# Patient Record
Sex: MALE | Employment: UNEMPLOYED | URBAN - METROPOLITAN AREA
[De-identification: names, ages, dates, MRNs, and addresses within clinical notes are randomized per-mention and may not be internally consistent; named-entity substitution may affect disease eponyms.]

---

## 2019-04-04 ENCOUNTER — OFFICE VISIT (OUTPATIENT)
Dept: UROLOGY | Age: 39
End: 2019-04-04
Payer: COMMERCIAL

## 2019-04-04 ENCOUNTER — HOSPITAL ENCOUNTER (OUTPATIENT)
Age: 39
Setting detail: SPECIMEN
Discharge: HOME | End: 2019-04-04
Payer: COMMERCIAL

## 2019-04-04 VITALS
SYSTOLIC BLOOD PRESSURE: 118 MMHG | TEMPERATURE: 96.9 F | HEIGHT: 66 IN | WEIGHT: 153 LBS | BODY MASS INDEX: 24.59 KG/M2 | DIASTOLIC BLOOD PRESSURE: 78 MMHG

## 2019-04-04 DIAGNOSIS — N20.1 URETERAL CALCULUS: ICD-10-CM

## 2019-04-04 DIAGNOSIS — N20.0 RENAL CALCULUS: ICD-10-CM

## 2019-04-04 DIAGNOSIS — N20.0 RENAL CALCULUS: Primary | ICD-10-CM

## 2019-04-04 LAB
-: ABNORMAL
AMORPHOUS: ABNORMAL
BACTERIA: ABNORMAL
BILIRUBIN URINE: NEGATIVE
CASTS UA: ABNORMAL /LPF
COLOR: ABNORMAL
COMMENT UA: ABNORMAL
CRYSTALS, UA: ABNORMAL /HPF
EPITHELIAL CELLS UA: ABNORMAL /HPF
GLUCOSE URINE: NEGATIVE
KETONES, URINE: NEGATIVE
LEUKOCYTE ESTERASE, URINE: NEGATIVE
MUCUS: ABNORMAL
NITRITE, URINE: NEGATIVE
OTHER OBSERVATIONS UA: ABNORMAL
PH UA: 5 (ref 5–8)
PROTEIN UA: ABNORMAL
RBC UA: ABNORMAL /HPF (ref 0–2)
RENAL EPITHELIAL, UA: ABNORMAL /HPF
SPECIFIC GRAVITY UA: 1.02 (ref 1–1.03)
TRICHOMONAS: ABNORMAL
TURBIDITY: CLEAR
URINE HGB: NEGATIVE
UROBILINOGEN, URINE: NORMAL
WBC UA: ABNORMAL /HPF
YEAST: ABNORMAL

## 2019-04-04 PROCEDURE — 99205 OFFICE O/P NEW HI 60 MIN: CPT | Performed by: UROLOGY

## 2019-04-04 PROCEDURE — 87086 URINE CULTURE/COLONY COUNT: CPT

## 2019-04-04 PROCEDURE — 81001 URINALYSIS AUTO W/SCOPE: CPT

## 2019-04-04 RX ORDER — HYDROCODONE BITARTRATE AND ACETAMINOPHEN 5; 325 MG/1; MG/1
TABLET ORAL
Refills: 0 | COMMUNITY
Start: 2019-04-02 | End: 2020-10-15 | Stop reason: ALTCHOICE

## 2019-04-04 RX ORDER — LEVOFLOXACIN 500 MG/1
TABLET, FILM COATED ORAL
Refills: 0 | COMMUNITY
Start: 2019-03-27 | End: 2020-10-15 | Stop reason: ALTCHOICE

## 2019-04-04 RX ORDER — OXYBUTYNIN CHLORIDE 5 MG/1
TABLET ORAL
Refills: 1 | COMMUNITY
Start: 2019-03-15 | End: 2020-10-15

## 2019-04-04 RX ORDER — TAMSULOSIN HYDROCHLORIDE 0.4 MG/1
0.4 CAPSULE ORAL DAILY
Qty: 30 CAPSULE | Refills: 0 | Status: SHIPPED | OUTPATIENT
Start: 2019-04-04 | End: 2020-10-15 | Stop reason: ALTCHOICE

## 2019-04-04 RX ORDER — ONDANSETRON 4 MG/1
TABLET, ORALLY DISINTEGRATING ORAL
Refills: 0 | COMMUNITY
Start: 2019-04-02 | End: 2020-10-15

## 2019-04-04 ASSESSMENT — ENCOUNTER SYMPTOMS
BACK PAIN: 0
EYE REDNESS: 0
VOMITING: 0
ABDOMINAL PAIN: 0
NAUSEA: 0
COUGH: 0
WHEEZING: 0
COLOR CHANGE: 0
EYE PAIN: 0
SHORTNESS OF BREATH: 0

## 2019-04-04 NOTE — PATIENT INSTRUCTIONS
SURVEY:    You may be receiving a survey from [employer] regarding your visit today. Please complete the survey to enable us to provide the highest quality of care to you and your family. If you cannot score us a very good on any question, please call the office to discuss how we could have made your experience a very good one. Thank you.

## 2019-04-04 NOTE — PROGRESS NOTES
HPI:    Patient is a 41 y.o. male in no acute distress. He is alert and oriented to person, place, and time. Patient being seen here today as a new patient. Patient was referred by the emergency department outside facility. Patient did have recent CT scan. This film did show several distal left ureteral coccus. There is also small stones in left kidney. There is also a nonobstructing stone in the right kidney. This film was independently reviewed today. Patient has had 2 lithotripsies in the past 3 months. Patient is currently having no pain. Patient did go to the emergency department 2 days ago for abdominal pain. This is mainly in the right side. Currently he has no fevers nausea or vomiting. Past Medical History:   Diagnosis Date    Adult behavior problem     Stone, kidney     Wears glasses      Past Surgical History:   Procedure Laterality Date    APPENDECTOMY      CYSTOSCOPY      CYSTOURETHROSCOPY/STENT REMOVAL      HERNIA REPAIR      KIDNEY STONE SURGERY      MOUTH SURGERY      URETER STENT PLACEMENT       Outpatient Encounter Medications as of 4/4/2019   Medication Sig Dispense Refill    tamsulosin (FLOMAX) 0.4 MG capsule Take 1 capsule by mouth daily Take to facilitate stone passage 30 capsule 0    HYDROcodone-acetaminophen (NORCO) 5-325 MG per tablet Take 1 tablet every 6 hours as needed for pain  0    levofloxacin (LEVAQUIN) 500 MG tablet TAKE 1 TABLET EVERY MORNING  0    ondansetron (ZOFRAN-ODT) 4 MG disintegrating tablet Dissolve 1 tablet in mouth every 6 hours as needed for nausea/vomiting  0    oxybutynin (DITROPAN) 5 MG tablet TAKE 1 TABLET TWICE DAILY FOR BLADDER spasms  1     No facility-administered encounter medications on file as of 4/4/2019.        Current Outpatient Medications on File Prior to Visit   Medication Sig Dispense Refill    HYDROcodone-acetaminophen (NORCO) 5-325 MG per tablet Take 1 tablet every 6 hours as needed for pain  0    levofloxacin (LEVAQUIN) 500 MG tablet TAKE 1 TABLET EVERY MORNING  0    ondansetron (ZOFRAN-ODT) 4 MG disintegrating tablet Dissolve 1 tablet in mouth every 6 hours as needed for nausea/vomiting  0    oxybutynin (DITROPAN) 5 MG tablet TAKE 1 TABLET TWICE DAILY FOR BLADDER spasms  1     No current facility-administered medications on file prior to visit. Amoxicillin and Other  Family History   Problem Relation Age of Onset    Crohn's Disease Father      Social History     Tobacco Use   Smoking Status Current Some Day Smoker    Types: Cigarettes   Smokeless Tobacco Current User    Types: Snuff, Chew       Social History     Substance and Sexual Activity   Alcohol Use Not Currently       Review of Systems   Constitutional: Negative for appetite change, chills and fever. Eyes: Negative for pain, redness and visual disturbance. Respiratory: Negative for cough, shortness of breath and wheezing. Cardiovascular: Negative for chest pain and leg swelling. Gastrointestinal: Negative for abdominal pain, nausea and vomiting. Genitourinary: Negative for difficulty urinating, discharge, dysuria, flank pain, frequency, hematuria, scrotal swelling and testicular pain. Musculoskeletal: Negative for back pain, joint swelling and myalgias. Skin: Negative for color change, rash and wound. Neurological: Negative for dizziness, tremors and numbness. Hematological: Negative for adenopathy. Does not bruise/bleed easily. /78   Temp 96.9 °F (36.1 °C) (Tympanic)   Ht 5' 6\" (1.676 m)   Wt 153 lb (69.4 kg)   BMI 24.69 kg/m²       PHYSICAL EXAM:  Constitutional: Patient in no acute distress; Neuro: alert and oriented to person place and time. Psych: Mood and affect normal.  Skin: Normal  Lungs: Respiratory effort normal  Cardiovascular:  Normal peripheral pulses  Abdomen: Soft, non-tender, non-distended with no CVA, flank pain, hepatosplenomegaly or hernia.   Kidneys normal.  Bladder non-tender and not distended. Lymphatics: no palpable lymphadenopathy  Penis normal  Urethral meatus normal  Scrotal exam normal  Testicles normal bilaterally  Epididymis normal bilaterally  No evidence of inguinal hernia        Lab Results   Component Value Date    BUN 13 02/13/2013     Lab Results   Component Value Date    CREATININE 0.91 02/13/2013     No results found for: PSA    ASSESSMENT:  This is a 41 y.o. male with the following diagnoses:   Diagnosis Orders   1. Renal calculus  Urinalysis with Microscopic    Urine culture clean catch   2. Ureteral calculus  Urinalysis with Microscopic    Urine culture clean catch    tamsulosin (FLOMAX) 0.4 MG capsule         PLAN:  We'll plan for left-sided holmium laser lithotripsy into weeks. I did give him Flomax today. I did recommend increasing fluids. He does best stones we will cancel his surgery. Otherwise we will go up with scope and laser in 2 weeks. I did explain to him the risks and benefits of the surgery. This included major risks such as ureteral perforation.

## 2019-04-05 LAB
CULTURE: NO GROWTH
Lab: NORMAL
SPECIMEN DESCRIPTION: NORMAL

## 2019-04-10 ENCOUNTER — ANESTHESIA EVENT (OUTPATIENT)
Dept: OPERATING ROOM | Age: 39
End: 2019-04-10
Payer: COMMERCIAL

## 2019-04-10 ENCOUNTER — TELEPHONE (OUTPATIENT)
Dept: UROLOGY | Age: 39
End: 2019-04-10

## 2019-04-10 NOTE — TELEPHONE ENCOUNTER
----- Message from Benancio Simmonds, APRN - CNP sent at 4/10/2019  7:29 AM EDT -----  Call pt - urine cx reviewed and negative for UTI & for significant microhematuria

## 2019-04-17 PROBLEM — N20.1 URETERAL CALCULUS: Status: ACTIVE | Noted: 2019-04-17

## 2019-04-17 NOTE — H&P
History and Physical    Patient:  Edwar Abad  MRN: [id number]    CHIEF COMPLAINT:  Left flan kpain    HISTORY OF PRESENT ILLNESS:   The patient is a 41 y.o. male who presents with Leftt flank pain. Patient being seen here today as a new patient. Patient was referred by the emergency department outside facility. Patient did have recent CT scan. This film did show several distal left ureteral coccus. There is also small stones in left kidney. There is also a nonobstructing stone in the right kidney. This film was independently reviewed today. Patient has had 2 lithotripsies in the past 3 months. Patient is currently having no pain. Patient did go to the emergency department 2 days ago for abdominal pain. This is mainly in the right side. Currently he has no fevers nausea or vomiting. Past Medical History:    Past Medical History:   Diagnosis Date    Adult behavior problem     Stone, kidney     Wears glasses        Past Surgical History:    Past Surgical History:   Procedure Laterality Date    APPENDECTOMY      CYSTOSCOPY      CYSTOURETHROSCOPY/STENT REMOVAL      HERNIA REPAIR      KIDNEY STONE SURGERY      MOUTH SURGERY      URETER STENT PLACEMENT         Medications Prior to Admission:    Prior to Admission medications    Medication Sig Start Date End Date Taking?  Authorizing Provider   HYDROcodone-acetaminophen (NORCO) 5-325 MG per tablet Take 1 tablet every 6 hours as needed for pain 4/2/19   Historical Provider, MD   levofloxacin (LEVAQUIN) 500 MG tablet TAKE 1 TABLET EVERY MORNING 3/27/19   Historical Provider, MD   ondansetron (ZOFRAN-ODT) 4 MG disintegrating tablet Dissolve 1 tablet in mouth every 6 hours as needed for nausea/vomiting 4/2/19   Historical Provider, MD   oxybutynin (DITROPAN) 5 MG tablet TAKE 1 TABLET TWICE DAILY FOR BLADDER spasms 3/15/19   Historical Provider, MD   tamsulosin (FLOMAX) 0.4 MG capsule Take 1 capsule by mouth daily Take to facilitate stone passage 4/4/19 Radha Hernández MD       Allergies:  Amoxicillin and Other    Social History:    Social History     Socioeconomic History    Marital status: Single     Spouse name: Not on file    Number of children: Not on file    Years of education: Not on file    Highest education level: Not on file   Occupational History    Not on file   Social Needs    Financial resource strain: Not on file    Food insecurity:     Worry: Not on file     Inability: Not on file    Transportation needs:     Medical: Not on file     Non-medical: Not on file   Tobacco Use    Smoking status: Current Some Day Smoker     Types: Cigarettes    Smokeless tobacco: Current User     Types: Snuff, Chew   Substance and Sexual Activity    Alcohol use: Not Currently    Drug use: Never    Sexual activity: Not on file   Lifestyle    Physical activity:     Days per week: Not on file     Minutes per session: Not on file    Stress: Not on file   Relationships    Social connections:     Talks on phone: Not on file     Gets together: Not on file     Attends [employer] service: Not on file     Active member of club or organization: Not on file     Attends meetings of clubs or organizations: Not on file     Relationship status: Not on file    Intimate partner violence:     Fear of current or ex partner: Not on file     Emotionally abused: Not on file     Physically abused: Not on file     Forced sexual activity: Not on file   Other Topics Concern    Not on file   Social History Narrative    Not on file       Family History:    Family History   Problem Relation Age of Onset    Crohn's Disease Father        REVIEW OF SYSTEMS:  All systems reviewed and negative except for that already noted in the HPI. Physical Exam:      No data found. Constitutional: Patient in no acute distress; Neuro: alert and oriented to person place and time.     Psych: Mood and affect normal.  Skin: Normal  Lungs: Respiratory effort normal  Cardiovascular:  Normal peripheral pulses  Abdomen: Soft, non-tender, non-distended with no CVA, flank pain, hepatosplenomegaly or hernia. Kidneys normal.  Bladder non-tender and not distended. Lymphatics: no palpable lymphadenopathy  Penis normal and circumcised  Urethral meatus normal  Scrotal exam normal  Testicles normal bilaterally  Epididymis normal bilaterally  No evidence of inguinal hernia  Anus and perineum normal  Normal rectal tone with no masses  Prostate soft, non-tender to palpation. No palpable nodules. Estimated 40 grams. Seminal vesicles not palpable. LABS:   No results for input(s): WBC, HGB, HCT, MCV, PLT in the last 72 hours. No results for input(s): NA, K, CL, CO2, PHOS, BUN, CREATININE in the last 72 hours. Invalid input(s): CA  No results found for: PSA    Additional Lab/culture results:    Urinalysis: No results for input(s): COLORU, PHUR, LABCAST, WBCUA, RBCUA, MUCUS, TRICHOMONAS, YEAST, BACTERIA, CLARITYU, SPECGRAV, LEUKOCYTESUR, UROBILINOGEN, Adah March in the last 72 hours.     Invalid input(s): NITRATE, GLUCOSEUKETONESUAMORPHOUS     -----------------------------------------------------------------  Imaging Results:    Assessment and Plan   Impression:    Patient Active Problem List   Diagnosis    Behavioral problems    Ureteral calculus       Plan: left KOTA Vargas  1:44 PM 4/17/2019

## 2019-04-17 NOTE — H&P (VIEW-ONLY)
History and Physical    Patient:  Zach Barraza  MRN: [id number]    CHIEF COMPLAINT:  Left flan kpain    HISTORY OF PRESENT ILLNESS:   The patient is a 41 y.o. male who presents with Leftt flank pain. Patient being seen here today as a new patient. Patient was referred by the emergency department outside facility. Patient did have recent CT scan. This film did show several distal left ureteral coccus. There is also small stones in left kidney. There is also a nonobstructing stone in the right kidney. This film was independently reviewed today. Patient has had 2 lithotripsies in the past 3 months. Patient is currently having no pain. Patient did go to the emergency department 2 days ago for abdominal pain. This is mainly in the right side. Currently he has no fevers nausea or vomiting. Past Medical History:    Past Medical History:   Diagnosis Date    Adult behavior problem     Stone, kidney     Wears glasses        Past Surgical History:    Past Surgical History:   Procedure Laterality Date    APPENDECTOMY      CYSTOSCOPY      CYSTOURETHROSCOPY/STENT REMOVAL      HERNIA REPAIR      KIDNEY STONE SURGERY      MOUTH SURGERY      URETER STENT PLACEMENT         Medications Prior to Admission:    Prior to Admission medications    Medication Sig Start Date End Date Taking?  Authorizing Provider   HYDROcodone-acetaminophen (NORCO) 5-325 MG per tablet Take 1 tablet every 6 hours as needed for pain 4/2/19   Historical Provider, MD   levofloxacin (LEVAQUIN) 500 MG tablet TAKE 1 TABLET EVERY MORNING 3/27/19   Historical Provider, MD   ondansetron (ZOFRAN-ODT) 4 MG disintegrating tablet Dissolve 1 tablet in mouth every 6 hours as needed for nausea/vomiting 4/2/19   Historical Provider, MD   oxybutynin (DITROPAN) 5 MG tablet TAKE 1 TABLET TWICE DAILY FOR BLADDER spasms 3/15/19   Historical Provider, MD   tamsulosin (FLOMAX) 0.4 MG capsule Take 1 capsule by mouth daily Take to facilitate stone passage 4/4/19

## 2019-04-18 ENCOUNTER — TELEPHONE (OUTPATIENT)
Dept: UROLOGY | Age: 39
End: 2019-04-18

## 2019-04-18 ENCOUNTER — APPOINTMENT (OUTPATIENT)
Dept: GENERAL RADIOLOGY | Age: 39
End: 2019-04-18
Attending: UROLOGY
Payer: COMMERCIAL

## 2019-04-18 ENCOUNTER — ANESTHESIA (OUTPATIENT)
Dept: OPERATING ROOM | Age: 39
End: 2019-04-18
Payer: COMMERCIAL

## 2019-04-18 ENCOUNTER — HOSPITAL ENCOUNTER (OUTPATIENT)
Age: 39
Setting detail: OUTPATIENT SURGERY
Discharge: HOME | End: 2019-04-18
Attending: UROLOGY | Admitting: UROLOGY
Payer: COMMERCIAL

## 2019-04-18 VITALS
HEART RATE: 80 BPM | RESPIRATION RATE: 20 BRPM | HEIGHT: 67 IN | TEMPERATURE: 96.8 F | BODY MASS INDEX: 23.7 KG/M2 | SYSTOLIC BLOOD PRESSURE: 126 MMHG | OXYGEN SATURATION: 95 % | WEIGHT: 151 LBS | DIASTOLIC BLOOD PRESSURE: 93 MMHG

## 2019-04-18 VITALS
TEMPERATURE: 98.6 F | DIASTOLIC BLOOD PRESSURE: 70 MMHG | RESPIRATION RATE: 12 BRPM | OXYGEN SATURATION: 100 % | SYSTOLIC BLOOD PRESSURE: 106 MMHG

## 2019-04-18 DIAGNOSIS — N20.1 URETERAL CALCULUS: Primary | ICD-10-CM

## 2019-04-18 PROCEDURE — 2709999900 HC NON-CHARGEABLE SUPPLY: Performed by: UROLOGY

## 2019-04-18 PROCEDURE — 3600000015 HC SURGERY LEVEL 5 ADDTL 15MIN: Performed by: UROLOGY

## 2019-04-18 PROCEDURE — 2580000003 HC RX 258: Performed by: UROLOGY

## 2019-04-18 PROCEDURE — C1769 GUIDE WIRE: HCPCS | Performed by: UROLOGY

## 2019-04-18 PROCEDURE — 7100000011 HC PHASE II RECOVERY - ADDTL 15 MIN: Performed by: UROLOGY

## 2019-04-18 PROCEDURE — 7100000000 HC PACU RECOVERY - FIRST 15 MIN: Performed by: UROLOGY

## 2019-04-18 PROCEDURE — C1758 CATHETER, URETERAL: HCPCS | Performed by: UROLOGY

## 2019-04-18 PROCEDURE — 3700000001 HC ADD 15 MINUTES (ANESTHESIA): Performed by: UROLOGY

## 2019-04-18 PROCEDURE — 2720000010 HC SURG SUPPLY STERILE: Performed by: UROLOGY

## 2019-04-18 PROCEDURE — 3600000005 HC SURGERY LEVEL 5 BASE: Performed by: UROLOGY

## 2019-04-18 PROCEDURE — 7100000001 HC PACU RECOVERY - ADDTL 15 MIN: Performed by: UROLOGY

## 2019-04-18 PROCEDURE — 2500000003 HC RX 250 WO HCPCS: Performed by: NURSE ANESTHETIST, CERTIFIED REGISTERED

## 2019-04-18 PROCEDURE — 76000 FLUOROSCOPY <1 HR PHYS/QHP: CPT

## 2019-04-18 PROCEDURE — 6360000002 HC RX W HCPCS: Performed by: UROLOGY

## 2019-04-18 PROCEDURE — C2617 STENT, NON-COR, TEM W/O DEL: HCPCS | Performed by: UROLOGY

## 2019-04-18 PROCEDURE — 7100000010 HC PHASE II RECOVERY - FIRST 15 MIN: Performed by: UROLOGY

## 2019-04-18 PROCEDURE — 6360000002 HC RX W HCPCS: Performed by: NURSE ANESTHETIST, CERTIFIED REGISTERED

## 2019-04-18 PROCEDURE — 3700000000 HC ANESTHESIA ATTENDED CARE: Performed by: UROLOGY

## 2019-04-18 PROCEDURE — 6370000000 HC RX 637 (ALT 250 FOR IP): Performed by: UROLOGY

## 2019-04-18 DEVICE — URETERAL STENT
Type: IMPLANTABLE DEVICE | Site: URETER | Status: FUNCTIONAL
Brand: PERCUFLEX™ PLUS

## 2019-04-18 RX ORDER — CIPROFLOXACIN 2 MG/ML
400 INJECTION, SOLUTION INTRAVENOUS
Status: COMPLETED | OUTPATIENT
Start: 2019-04-18 | End: 2019-04-18

## 2019-04-18 RX ORDER — SODIUM CHLORIDE 0.9 % (FLUSH) 0.9 %
10 SYRINGE (ML) INJECTION PRN
Status: DISCONTINUED | OUTPATIENT
Start: 2019-04-18 | End: 2019-04-18 | Stop reason: HOSPADM

## 2019-04-18 RX ORDER — LIDOCAINE HYDROCHLORIDE 10 MG/ML
INJECTION, SOLUTION EPIDURAL; INFILTRATION; INTRACAUDAL; PERINEURAL PRN
Status: DISCONTINUED | OUTPATIENT
Start: 2019-04-18 | End: 2019-04-18 | Stop reason: SDUPTHER

## 2019-04-18 RX ORDER — MIDAZOLAM HYDROCHLORIDE 1 MG/ML
INJECTION INTRAMUSCULAR; INTRAVENOUS PRN
Status: DISCONTINUED | OUTPATIENT
Start: 2019-04-18 | End: 2019-04-18 | Stop reason: SDUPTHER

## 2019-04-18 RX ORDER — PROPOFOL 10 MG/ML
INJECTION, EMULSION INTRAVENOUS PRN
Status: DISCONTINUED | OUTPATIENT
Start: 2019-04-18 | End: 2019-04-18 | Stop reason: SDUPTHER

## 2019-04-18 RX ORDER — SODIUM CHLORIDE, SODIUM LACTATE, POTASSIUM CHLORIDE, CALCIUM CHLORIDE 600; 310; 30; 20 MG/100ML; MG/100ML; MG/100ML; MG/100ML
INJECTION, SOLUTION INTRAVENOUS CONTINUOUS
Status: DISCONTINUED | OUTPATIENT
Start: 2019-04-18 | End: 2019-04-18 | Stop reason: HOSPADM

## 2019-04-18 RX ORDER — SODIUM CHLORIDE 0.9 % (FLUSH) 0.9 %
10 SYRINGE (ML) INJECTION EVERY 12 HOURS SCHEDULED
Status: DISCONTINUED | OUTPATIENT
Start: 2019-04-18 | End: 2019-04-18 | Stop reason: HOSPADM

## 2019-04-18 RX ORDER — ONDANSETRON 2 MG/ML
INJECTION INTRAMUSCULAR; INTRAVENOUS PRN
Status: DISCONTINUED | OUTPATIENT
Start: 2019-04-18 | End: 2019-04-18 | Stop reason: SDUPTHER

## 2019-04-18 RX ORDER — DEXAMETHASONE SODIUM PHOSPHATE 10 MG/ML
INJECTION INTRAMUSCULAR; INTRAVENOUS PRN
Status: DISCONTINUED | OUTPATIENT
Start: 2019-04-18 | End: 2019-04-18 | Stop reason: SDUPTHER

## 2019-04-18 RX ORDER — FENTANYL CITRATE 50 UG/ML
INJECTION, SOLUTION INTRAMUSCULAR; INTRAVENOUS PRN
Status: DISCONTINUED | OUTPATIENT
Start: 2019-04-18 | End: 2019-04-18 | Stop reason: SDUPTHER

## 2019-04-18 RX ORDER — KETOROLAC TROMETHAMINE 30 MG/ML
INJECTION, SOLUTION INTRAMUSCULAR; INTRAVENOUS PRN
Status: DISCONTINUED | OUTPATIENT
Start: 2019-04-18 | End: 2019-04-18 | Stop reason: SDUPTHER

## 2019-04-18 RX ORDER — HYDROCODONE BITARTRATE AND ACETAMINOPHEN 5; 325 MG/1; MG/1
1 TABLET ORAL EVERY 6 HOURS PRN
Qty: 18 TABLET | Refills: 0 | Status: SHIPPED | OUTPATIENT
Start: 2019-04-18 | End: 2019-04-21

## 2019-04-18 RX ADMIN — PROPOFOL 160 MG: 10 INJECTION, EMULSION INTRAVENOUS at 10:02

## 2019-04-18 RX ADMIN — DEXAMETHASONE SODIUM PHOSPHATE 10 MG: 10 INJECTION INTRAMUSCULAR; INTRAVENOUS at 10:07

## 2019-04-18 RX ADMIN — LIDOCAINE HYDROCHLORIDE 4 ML: 10 INJECTION, SOLUTION EPIDURAL; INFILTRATION; INTRACAUDAL; PERINEURAL at 10:02

## 2019-04-18 RX ADMIN — FENTANYL CITRATE 50 MCG: 50 INJECTION INTRAMUSCULAR; INTRAVENOUS at 10:01

## 2019-04-18 RX ADMIN — ONDANSETRON 4 MG: 2 INJECTION, SOLUTION INTRAMUSCULAR; INTRAVENOUS at 10:06

## 2019-04-18 RX ADMIN — SODIUM CHLORIDE, POTASSIUM CHLORIDE, SODIUM LACTATE AND CALCIUM CHLORIDE: 600; 310; 30; 20 INJECTION, SOLUTION INTRAVENOUS at 07:57

## 2019-04-18 RX ADMIN — KETOROLAC TROMETHAMINE 30 MG: 30 INJECTION, SOLUTION INTRAMUSCULAR at 10:21

## 2019-04-18 RX ADMIN — MIDAZOLAM HYDROCHLORIDE 2 MG: 2 INJECTION, SOLUTION INTRAMUSCULAR; INTRAVENOUS at 09:48

## 2019-04-18 RX ADMIN — CIPROFLOXACIN 400 MG: 2 INJECTION, SOLUTION INTRAVENOUS at 08:47

## 2019-04-18 ASSESSMENT — LIFESTYLE VARIABLES: SMOKING_STATUS: 1

## 2019-04-18 NOTE — BRIEF OP NOTE
Brief Postoperative Note  ______________________________________________________________    Patient: Laxmi Duron  YOB: 1980  MRN: [id number]  Date of Procedure: 4/18/2019    Pre-Op Diagnosis: LEFT URETERAL CALCULUS    Post-Op Diagnosis: Same       Procedure(s):  CYSTOSCOPY URETEROSCOPY LASER- LEFT HLL, WITH LEFT STENT PLACEMENT    Anesthesia: General    Surgeon(s):  Hugo Aldrich MD    Assistant: none    Estimated Blood Loss (mL): less than 50     Complications: None    Specimens:   * No specimens in log *    Implants:  Implant Name Type Inv.  Item Serial No.  Lot No. LRB No. Used   STENT URET HYDRPL COAT W/O [id number] Stent:Urological STENT URET HYDRPL COAT W/O [id number]  [place] [employer]: INTERVENTIONAL CARDIO [id number] Left 1         Drains: * No LDAs found *    Findings: multiple distal left ureteral calculus    Hugo Aldrich MD  Date: 4/18/2019  Time: 10:51 AM

## 2019-04-18 NOTE — ANESTHESIA POSTPROCEDURE EVALUATION
Department of Anesthesiology  Postprocedure Note    Patient: Helen Pitt  MRN: [id number]  [place]: 1980  Date of evaluation: 4/18/2019  Time:  11:00 AM     Procedure Summary     Date:  04/18/19 Room / Location:  [address] [place] [address]    Anesthesia Start:  0956 Anesthesia Stop:  1059    Procedure:  CYSTOSCOPY URETEROSCOPY LASER- LEFT HLL, WITH LEFT STENT PLACEMENT (Left Bladder) Diagnosis:  (LEFT URETERAL CALCULUS)    Surgeon:  Natali Vital MD Responsible Provider:  VITALIY Cooley CRNA    Anesthesia Type:  general ASA Status:  2          Anesthesia Type: general    Serenity Phase I: Serenity Score: 10    Serenity Phase II:      Last vitals: Reviewed and per EMR flowsheets.        Anesthesia Post Evaluation    Patient location during evaluation: PACU  Patient participation: complete - patient participated  Level of consciousness: awake  Pain score: 0  Airway patency: patent  Nausea & Vomiting: no nausea and no vomiting  Complications: no  Cardiovascular status: hemodynamically stable  Respiratory status: acceptable and spontaneous ventilation  Hydration status: euvolemic

## 2019-04-18 NOTE — PROGRESS NOTES
Patient rests quietly in phase 1 recovery after procedure. Becomes slightly agitated with bp cuff. Changed frequency to q15 minutes. Reassurance given. States, \"I don't like this taste. \"  Wipes at his mouth. Ice chips given. Tolerates well. Patient cooperative, responds well to reassurance.

## 2019-04-18 NOTE — PROGRESS NOTES

## 2019-04-18 NOTE — ANESTHESIA PRE PROCEDURE
 Wears glasses        Past Surgical History:        Procedure Laterality Date    APPENDECTOMY      CYSTOSCOPY      CYSTOURETHROSCOPY/STENT REMOVAL      HERNIA REPAIR      KIDNEY STONE SURGERY      MOUTH SURGERY      URETER STENT PLACEMENT         Social History:    Social History     Tobacco Use    Smoking status: Current Some Day Smoker     Types: Cigarettes    Smokeless tobacco: Current User     Types: Snuff, Chew   Substance Use Topics    Alcohol use: Not Currently                                Ready to quit: Not Answered  Counseling given: Not Answered      Vital Signs (Current):   Vitals:    04/18/19 0742 04/18/19 0747   BP:  (!) 137/97   Pulse:  75   Resp:  20   Temp:  36.9 °C (98.5 °F)   SpO2:  97%   Weight: 151 lb (68.5 kg)    Height: 5' 7\" (1.702 m)                                               BP Readings from Last 3 Encounters:   04/18/19 (!) 137/97   04/04/19 118/78       NPO Status:                                                                                 BMI:   Wt Readings from Last 3 Encounters:   04/18/19 151 lb (68.5 kg)   04/04/19 153 lb (69.4 kg)     Body mass index is 23.65 kg/m². CBC:   Lab Results   Component Value Date    WBC 8.3 02/13/2013    RBC 5.02 02/13/2013    HGB 14.5 02/13/2013    HCT 44.3 02/13/2013    MCV 88.1 02/13/2013    RDW 16.3 02/13/2013     02/13/2013       CMP:   Lab Results   Component Value Date     02/13/2013    K 3.8 02/13/2013     02/13/2013    CO2 31 02/13/2013    BUN 13 02/13/2013    CREATININE 0.91 02/13/2013    GFRAA >60 02/13/2013    LABGLOM >60 02/13/2013    GLUCOSE 87 02/13/2013    CALCIUM 9.9 02/13/2013    BILITOT 0.99 02/13/2013    ALKPHOS 68 02/13/2013    AST 16 02/13/2013    ALT 13 02/13/2013       POC Tests: No results for input(s): POCGLU, POCNA, POCK, POCCL, POCBUN, POCHEMO, POCHCT in the last 72 hours.     Coags: No results found for: PROTIME, INR, APTT    HCG (If Applicable): No results found for: PREGTESTUR,

## 2019-04-19 NOTE — OP NOTE
200 micron laser fiber up and  ablated stone fragments. We were able then to place a stone basket up  and pulled several of these fragments out of the distal ureter dropped  in the bladder. Once this was done, we then went up into the kidney. We did a formal pyeloscopy and could not find any residual stones. We  then removed the scope leaving the Glidewire in place. We placed the  cystoscope over the guidewire and placed a 6-Mong[place]ian 26 cm double-J  ureteral stent over the guidewire up into the kidney. Guidewire was  removed. Proximal curl was confirmed by fluoroscopy. Distal curl was  confirmed by visualization. At this point in time, the stent string was  attached to the penis with Steris and benzoin. He was then awoken from  general anesthesia, transferred to the [facility], and taken to the PACU in  satisfactory condition by nursing and anesthesia teams. PLAN:  The patient will be discharged home per PACU criteria and will  follow up with us in 1 week for stent removal via string.         Zakiya Amaya    D: 04/18/2019 13:21:01       T: 04/18/2019 15:07:03     [id number]  Job#: [id number]     Doc#: [id number]    CC:

## 2019-04-23 ENCOUNTER — PROCEDURE VISIT (OUTPATIENT)
Dept: UROLOGY | Age: 39
End: 2019-04-23
Payer: COMMERCIAL

## 2019-04-23 VITALS
HEART RATE: 73 BPM | TEMPERATURE: 97.6 F | DIASTOLIC BLOOD PRESSURE: 94 MMHG | WEIGHT: 152 LBS | BODY MASS INDEX: 23.81 KG/M2 | SYSTOLIC BLOOD PRESSURE: 133 MMHG

## 2019-04-23 DIAGNOSIS — Z96.0 URETERAL STENT RETAINED: ICD-10-CM

## 2019-04-23 DIAGNOSIS — N20.1 URETERAL CALCULUS: Primary | ICD-10-CM

## 2019-04-23 PROCEDURE — 99215 OFFICE O/P EST HI 40 MIN: CPT | Performed by: NURSE PRACTITIONER

## 2019-04-23 PROCEDURE — G8427 DOCREV CUR MEDS BY ELIG CLIN: HCPCS | Performed by: NURSE PRACTITIONER

## 2019-04-23 PROCEDURE — 4004F PT TOBACCO SCREEN RCVD TLK: CPT | Performed by: NURSE PRACTITIONER

## 2019-04-23 PROCEDURE — G8420 CALC BMI NORM PARAMETERS: HCPCS | Performed by: NURSE PRACTITIONER

## 2019-04-24 ENCOUNTER — ANESTHESIA EVENT (OUTPATIENT)
Dept: OPERATING ROOM | Age: 39
End: 2019-04-24
Payer: COMMERCIAL

## 2019-04-24 ASSESSMENT — ENCOUNTER SYMPTOMS
COUGH: 0
WHEEZING: 0
SHORTNESS OF BREATH: 0
EYE REDNESS: 0
ABDOMINAL PAIN: 0
COLOR CHANGE: 0
CONSTIPATION: 0
VOMITING: 0
BACK PAIN: 0
NAUSEA: 0
EYE PAIN: 0

## 2019-04-24 NOTE — PROGRESS NOTES
HPI:    Patient is a 41 y.o. male in no acute distress. He is alert and oriented to person, place, and time. History  12/2018 cysto left stent placement    3/15/2019 left ESWL with Dr. Klarissa Hensley    4/2019 CT showed several distal left ureteral stones. There is also small stones in left kidney. There is also a nonobstructing stone in the right kidney. 4/18/2019 left HLL    Today  Here today originally for stent removal following left HLL on 4/18/2019, but patient is unable to tolerate stent removal in the office after multiple attempts. We even tried to have his dad help us without resolve. Past Medical History:   Diagnosis Date    Adult behavior problem     Stone, kidney     Wears glasses      Past Surgical History:   Procedure Laterality Date    APPENDECTOMY      CYSTOSCOPY      CYSTOSCOPY Left 04/18/2019    per Dr. Wright April Left 4/18/2019    CYSTOSCOPY URETEROSCOPY LASER- LEFT HLL, WITH LEFT STENT PLACEMENT performed by Derik Ceballos MD at [address] [place] [address] CYSTOURETHROSCOPY/STENT 350 [address]       Outpatient Encounter Medications as of 4/23/2019   Medication Sig Dispense Refill    HYDROcodone-acetaminophen (NORCO) 5-325 MG per tablet Take 1 tablet every 6 hours as needed for pain  0    levofloxacin (LEVAQUIN) 500 MG tablet TAKE 1 TABLET EVERY MORNING  0    ondansetron (ZOFRAN-ODT) 4 MG disintegrating tablet Dissolve 1 tablet in mouth every 6 hours as needed for nausea/vomiting  0    oxybutynin (DITROPAN) 5 MG tablet TAKE 1 TABLET TWICE DAILY FOR BLADDER spasms  1    tamsulosin (FLOMAX) 0.4 MG capsule Take 1 capsule by mouth daily Take to facilitate stone passage 30 capsule 0     No facility-administered encounter medications on file as of 4/23/2019.        Current Outpatient Medications on File Prior to Visit   Medication Sig Dispense Refill    HYDROcodone-acetaminophen (NORCO) 5-325 MG per tablet Take 1 tablet every 6 hours as needed for pain  0    levofloxacin (LEVAQUIN) 500 MG tablet TAKE 1 TABLET EVERY MORNING  0    ondansetron (ZOFRAN-ODT) 4 MG disintegrating tablet Dissolve 1 tablet in mouth every 6 hours as needed for nausea/vomiting  0    oxybutynin (DITROPAN) 5 MG tablet TAKE 1 TABLET TWICE DAILY FOR BLADDER spasms  1    tamsulosin (FLOMAX) 0.4 MG capsule Take 1 capsule by mouth daily Take to facilitate stone passage 30 capsule 0     No current facility-administered medications on file prior to visit. Amoxicillin  Family History   Problem Relation Age of Onset    Crohn's Disease Father      Social History     Tobacco Use   Smoking Status Current Some Day Smoker    Types: Cigarettes   Smokeless Tobacco Current User    Types: Snuff, Chew       Social History     Substance and Sexual Activity   Alcohol Use Not Currently       Review of Systems   Constitutional: Negative for appetite change, chills and fever. Eyes: Negative for pain, redness and visual disturbance. Respiratory: Negative for cough, shortness of breath and wheezing. Cardiovascular: Negative for chest pain and leg swelling. Gastrointestinal: Negative for abdominal pain, constipation, nausea and vomiting. Genitourinary: Negative for decreased urine volume, difficulty urinating, discharge, dysuria, enuresis, flank pain, frequency, hematuria, penile pain, scrotal swelling, testicular pain and urgency. Musculoskeletal: Negative for back pain, joint swelling and myalgias. Skin: Negative for color change, rash and wound. Neurological: Negative for dizziness, tremors and numbness. Hematological: Negative for adenopathy. Does not bruise/bleed easily. BP (!) 133/94 (Site: Right Upper Arm, Position: Sitting, Cuff Size: Medium Adult)   Pulse 73   Temp 97.6 °F (36.4 °C) (Oral)   Wt 152 lb (68.9 kg)   BMI 23.81 kg/m²       PHYSICAL EXAM:  Constitutional: Patient in no acute distress;    Neuro: alert and

## 2019-04-25 ENCOUNTER — ANESTHESIA (OUTPATIENT)
Dept: OPERATING ROOM | Age: 39
End: 2019-04-25
Payer: COMMERCIAL

## 2019-04-25 ENCOUNTER — HOSPITAL ENCOUNTER (OUTPATIENT)
Age: 39
Setting detail: OUTPATIENT SURGERY
Discharge: HOME | End: 2019-04-25
Attending: UROLOGY | Admitting: UROLOGY
Payer: COMMERCIAL

## 2019-04-25 VITALS — DIASTOLIC BLOOD PRESSURE: 70 MMHG | OXYGEN SATURATION: 99 % | SYSTOLIC BLOOD PRESSURE: 111 MMHG

## 2019-04-25 VITALS
RESPIRATION RATE: 16 BRPM | OXYGEN SATURATION: 100 % | SYSTOLIC BLOOD PRESSURE: 118 MMHG | WEIGHT: 152 LBS | BODY MASS INDEX: 23.86 KG/M2 | HEIGHT: 67 IN | DIASTOLIC BLOOD PRESSURE: 71 MMHG | TEMPERATURE: 97.9 F | HEART RATE: 65 BPM

## 2019-04-25 PROCEDURE — 7100000010 HC PHASE II RECOVERY - FIRST 15 MIN: Performed by: UROLOGY

## 2019-04-25 PROCEDURE — 6370000000 HC RX 637 (ALT 250 FOR IP): Performed by: UROLOGY

## 2019-04-25 PROCEDURE — 3600000003 HC SURGERY LEVEL 3 BASE: Performed by: UROLOGY

## 2019-04-25 PROCEDURE — 3600000013 HC SURGERY LEVEL 3 ADDTL 15MIN: Performed by: UROLOGY

## 2019-04-25 PROCEDURE — 2580000003 HC RX 258: Performed by: UROLOGY

## 2019-04-25 PROCEDURE — 7100000011 HC PHASE II RECOVERY - ADDTL 15 MIN: Performed by: UROLOGY

## 2019-04-25 PROCEDURE — 2709999900 HC NON-CHARGEABLE SUPPLY: Performed by: UROLOGY

## 2019-04-25 PROCEDURE — 2500000003 HC RX 250 WO HCPCS: Performed by: NURSE ANESTHETIST, CERTIFIED REGISTERED

## 2019-04-25 PROCEDURE — 3700000001 HC ADD 15 MINUTES (ANESTHESIA): Performed by: UROLOGY

## 2019-04-25 PROCEDURE — 3700000000 HC ANESTHESIA ATTENDED CARE: Performed by: UROLOGY

## 2019-04-25 PROCEDURE — 6360000002 HC RX W HCPCS: Performed by: NURSE ANESTHETIST, CERTIFIED REGISTERED

## 2019-04-25 RX ORDER — LIDOCAINE HYDROCHLORIDE 20 MG/ML
INJECTION, SOLUTION INFILTRATION; PERINEURAL PRN
Status: DISCONTINUED | OUTPATIENT
Start: 2019-04-25 | End: 2019-04-25 | Stop reason: SDUPTHER

## 2019-04-25 RX ORDER — ACETAMINOPHEN 325 MG/1
650 TABLET ORAL ONCE
Status: COMPLETED | OUTPATIENT
Start: 2019-04-25 | End: 2019-04-25

## 2019-04-25 RX ORDER — PROPOFOL 10 MG/ML
INJECTION, EMULSION INTRAVENOUS PRN
Status: DISCONTINUED | OUTPATIENT
Start: 2019-04-25 | End: 2019-04-25 | Stop reason: SDUPTHER

## 2019-04-25 RX ORDER — DIMENHYDRINATE 50 MG/1
50 TABLET ORAL ONCE
Status: COMPLETED | OUTPATIENT
Start: 2019-04-25 | End: 2019-04-25

## 2019-04-25 RX ORDER — SODIUM CHLORIDE, SODIUM LACTATE, POTASSIUM CHLORIDE, CALCIUM CHLORIDE 600; 310; 30; 20 MG/100ML; MG/100ML; MG/100ML; MG/100ML
INJECTION, SOLUTION INTRAVENOUS CONTINUOUS
Status: DISCONTINUED | OUTPATIENT
Start: 2019-04-25 | End: 2019-04-25 | Stop reason: HOSPADM

## 2019-04-25 RX ADMIN — SODIUM CHLORIDE, POTASSIUM CHLORIDE, SODIUM LACTATE AND CALCIUM CHLORIDE: 600; 310; 30; 20 INJECTION, SOLUTION INTRAVENOUS at 10:28

## 2019-04-25 RX ADMIN — DIMENHYDRINATE 50 MG: 50 TABLET ORAL at 10:29

## 2019-04-25 RX ADMIN — ACETAMINOPHEN 650 MG: 325 TABLET, FILM COATED ORAL at 10:29

## 2019-04-25 RX ADMIN — SODIUM CHLORIDE, POTASSIUM CHLORIDE, SODIUM LACTATE AND CALCIUM CHLORIDE: 600; 310; 30; 20 INJECTION, SOLUTION INTRAVENOUS at 10:30

## 2019-04-25 RX ADMIN — LIDOCAINE HYDROCHLORIDE 100 MG: 20 INJECTION, SOLUTION INFILTRATION; PERINEURAL at 10:58

## 2019-04-25 RX ADMIN — PROPOFOL 70 MG: 10 INJECTION, EMULSION INTRAVENOUS at 10:58

## 2019-04-25 ASSESSMENT — PULMONARY FUNCTION TESTS
PIF_VALUE: 1
PIF_VALUE: 0
PIF_VALUE: 0

## 2019-04-25 ASSESSMENT — PAIN SCALES - GENERAL: PAINLEVEL_OUTOF10: 0

## 2019-04-25 NOTE — ANESTHESIA PRE PROCEDURE
Department of Anesthesiology  Preprocedure Note       Name:  Derrick Almendarez   Age:  41 y.o.  :  1980                                          MRN:  [id number]         Date:  2019      Surgeon: Shaggy Holcomb):  Reg Chou MD    Procedure: Chanetta Prude REMOVAL (Left )    Medications prior to admission:   Prior to Admission medications    Medication Sig Start Date End Date Taking? Authorizing Provider   HYDROcodone-acetaminophen (NORCO) 5-325 MG per tablet Take 1 tablet every 6 hours as needed for pain 19  Yes Historical Provider, MD   levofloxacin (LEVAQUIN) 500 MG tablet TAKE 1 TABLET EVERY MORNING 3/27/19  Yes Historical Provider, MD   ondansetron (ZOFRAN-ODT) 4 MG disintegrating tablet Dissolve 1 tablet in mouth every 6 hours as needed for nausea/vomiting 19  Yes Historical Provider, MD   oxybutynin (DITROPAN) 5 MG tablet TAKE 1 TABLET TWICE DAILY FOR BLADDER spasms 3/15/19  Yes Historical Provider, MD   tamsulosin (FLOMAX) 0.4 MG capsule Take 1 capsule by mouth daily Take to facilitate stone passage 19  Yes Reg Chou MD       Current medications:    Current Facility-Administered Medications   Medication Dose Route Frequency Provider Last Rate Last Dose    lactated ringers infusion   Intravenous Continuous Reg Chou  mL/hr at 19 1028         Allergies:     Allergies   Allergen Reactions    Amoxicillin Other (See Comments)     unknown       Problem List:    Patient Active Problem List   Diagnosis Code    Behavioral problems [id number]    Ureteral calculus N20.1       Past Medical History:        Diagnosis Date    Adult behavior problem     Stone, kidney     Wears glasses        Past Surgical History:        Procedure Laterality Date    APPENDECTOMY      CYSTOSCOPY      CYSTOSCOPY Left 2019    per Dr. Heather Wilson Left 2019    CYSTOSCOPY URETEROSCOPY LASER- LEFT HLL, WITH LEFT STENT PLACEMENT performed by Reg Chou MD at [facility]

## 2019-04-25 NOTE — BRIEF OP NOTE
Brief Postoperative Note  ______________________________________________________________    Patient: Arti Wood  YOB: 1980  MRN: [id number]  Date of Procedure: 4/25/2019    Pre-Op Diagnosis: LEFT URETERAL CALCULUS    Post-Op Diagnosis: Same       Procedure(s):  Stent Removal via string    Anesthesia: Monitor Anesthesia Care    Surgeon(s):  Annie Aldana MD    Assistant: none    Estimated Blood Loss (mL): less than 50     Complications: None    Specimens:   * No specimens in log *    Implants:  * No implants in log *      Drains: * No LDAs found *    Findings: stent    Annie Aldana MD  Date: 4/25/2019  Time: 11:00 AM

## 2019-04-25 NOTE — ANESTHESIA POSTPROCEDURE EVALUATION
Department of Anesthesiology  Postprocedure Note    Patient: Oliva Mares  MRN: [id number]  YOB: 1980  Date of evaluation: 4/25/2019  Time:  12:03 PM     Procedure Summary     Date:  04/25/19 Room / Location:  [facility]    Anesthesia Start:  8603 Anesthesia Stop:  1110    Procedure:  CYSTOSCOPY STENT REMOVAL (Left Ureter) Diagnosis:  (LEFT URETERAL CALCULUS)    Surgeon:  Ely Hood MD Responsible Provider: VITALIY Molina CRNA    Anesthesia Type:  MAC ASA Status:  2          Anesthesia Type: MAC    Serenity Phase I: Serenity Score: 10    Serenity Phase II:      Last vitals: Reviewed and per EMR flowsheets.        Anesthesia Post Evaluation    Patient location during evaluation: PACU  Patient participation: complete - patient participated  Level of consciousness: awake and alert  Airway patency: patent  Nausea & Vomiting: no nausea and no vomiting  Complications: no  Cardiovascular status: blood pressure returned to baseline and hemodynamically stable  Respiratory status: acceptable and room air  Hydration status: euvolemic

## 2019-04-30 ENCOUNTER — TELEPHONE (OUTPATIENT)
Dept: UROLOGY | Age: 39
End: 2019-04-30

## 2019-04-30 DIAGNOSIS — N20.1 URETERAL CALCULUS: Primary | ICD-10-CM

## 2019-04-30 NOTE — TELEPHONE ENCOUNTER
Called patient's father and scheduled patient for 6 week follow up post HLL/ureteroscopy.   Need KUB order

## 2019-06-13 ENCOUNTER — HOSPITAL ENCOUNTER (OUTPATIENT)
Dept: GENERAL RADIOLOGY | Age: 39
Discharge: HOME | End: 2019-06-15
Payer: COMMERCIAL

## 2019-06-13 ENCOUNTER — OFFICE VISIT (OUTPATIENT)
Dept: UROLOGY | Age: 39
End: 2019-06-13
Payer: COMMERCIAL

## 2019-06-13 ENCOUNTER — HOSPITAL ENCOUNTER (OUTPATIENT)
Age: 39
Discharge: HOME | End: 2019-06-15
Payer: COMMERCIAL

## 2019-06-13 VITALS — WEIGHT: 146 LBS | DIASTOLIC BLOOD PRESSURE: 68 MMHG | BODY MASS INDEX: 22.87 KG/M2 | SYSTOLIC BLOOD PRESSURE: 108 MMHG

## 2019-06-13 DIAGNOSIS — N20.1 URETERAL CALCULUS: ICD-10-CM

## 2019-06-13 DIAGNOSIS — N20.0 RENAL CALCULUS: Primary | ICD-10-CM

## 2019-06-13 PROCEDURE — 4004F PT TOBACCO SCREEN RCVD TLK: CPT | Performed by: UROLOGY

## 2019-06-13 PROCEDURE — G8427 DOCREV CUR MEDS BY ELIG CLIN: HCPCS | Performed by: UROLOGY

## 2019-06-13 PROCEDURE — 74018 RADEX ABDOMEN 1 VIEW: CPT

## 2019-06-13 PROCEDURE — 99213 OFFICE O/P EST LOW 20 MIN: CPT | Performed by: UROLOGY

## 2019-06-13 PROCEDURE — G8420 CALC BMI NORM PARAMETERS: HCPCS | Performed by: UROLOGY

## 2019-06-13 ASSESSMENT — ENCOUNTER SYMPTOMS
EYE PAIN: 0
SHORTNESS OF BREATH: 0
EYE REDNESS: 0
BACK PAIN: 0
COUGH: 0
NAUSEA: 0
COLOR CHANGE: 0
WHEEZING: 0
ABDOMINAL PAIN: 0
VOMITING: 0

## 2019-06-13 NOTE — PATIENT INSTRUCTIONS
SURVEY:    You may be receiving a survey from [employer] regarding your visit today. Please complete the survey to enable us to provide the highest quality of care to you and your family. If you cannot score us a very good on any question, please call the office to discuss how we could have made your experience a very good one. Thank you.

## 2019-06-13 NOTE — PROGRESS NOTES
HPI:    Patient is a 41 y.o. male in no acute distress. He is alert and oriented to person, place, and time. History  12/2018 cysto left stent placement     3/15/2019 left ESWL with Dr. Pema Wesley     4/2019 CT showed several distal left ureteral stones. There is also small stones in left kidney. There is also a nonobstructing stone in the right kidney.       4/18/2019 left HLL    Currently  Pt here today 6-week follow-up status post laser lithotripsy. PatientIs doing very well. Patient has no recent gross hematuria or dysuria. He reports no pain today. He has no nausea or vomiting. Patient did have a KUB prior to today's visit. This film was independently reviewed today. This does show no significant  calcifications.       Past Medical History:   Diagnosis Date    Adult behavior problem     Stone, kidney     Wears glasses      Past Surgical History:   Procedure Laterality Date    APPENDECTOMY      CYSTOSCOPY      CYSTOSCOPY Left 04/18/2019    per Dr. Hernan Palmer Left 4/18/2019    CYSTOSCOPY URETEROSCOPY LASER- LEFT HLL, WITH LEFT STENT PLACEMENT performed by Quinten Macias MD at [facility] / [address] [place] [address] Rd / Padmini Jang Left 4/25/2019    CYSTOSCOPY STENT REMOVAL performed by Quinten Macias MD at [facility]       Outpatient Encounter Medications as of 6/13/2019   Medication Sig Dispense Refill    HYDROcodone-acetaminophen (NORCO) 5-325 MG per tablet Take 1 tablet every 6 hours as needed for pain  0    levofloxacin (LEVAQUIN) 500 MG tablet TAKE 1 TABLET EVERY MORNING  0    ondansetron (ZOFRAN-ODT) 4 MG disintegrating tablet Dissolve 1 tablet in mouth every 6 hours as needed for nausea/vomiting  0    oxybutynin (DITROPAN) 5 MG tablet TAKE 1 TABLET TWICE DAILY FOR BLADDER spasms  1    tamsulosin (FLOMAX) 0.4 MG capsule Take 1 capsule by mouth daily Take to facilitate stone passage 30 capsule 0     No facility-administered encounter medications on file as of 6/13/2019. Current Outpatient Medications on File Prior to Visit   Medication Sig Dispense Refill    HYDROcodone-acetaminophen (NORCO) 5-325 MG per tablet Take 1 tablet every 6 hours as needed for pain  0    levofloxacin (LEVAQUIN) 500 MG tablet TAKE 1 TABLET EVERY MORNING  0    ondansetron (ZOFRAN-ODT) 4 MG disintegrating tablet Dissolve 1 tablet in mouth every 6 hours as needed for nausea/vomiting  0    oxybutynin (DITROPAN) 5 MG tablet TAKE 1 TABLET TWICE DAILY FOR BLADDER spasms  1    tamsulosin (FLOMAX) 0.4 MG capsule Take 1 capsule by mouth daily Take to facilitate stone passage 30 capsule 0     No current facility-administered medications on file prior to visit. Amoxicillin  Family History   Problem Relation Age of Onset    Crohn's Disease Father      Social History     Tobacco Use   Smoking Status Current Some Day Smoker    Types: Cigarettes   Smokeless Tobacco Current User    Types: Snuff, Chew       Social History     Substance and Sexual Activity   Alcohol Use Not Currently       Review of Systems   Constitutional: Negative for appetite change, chills and fever. Eyes: Negative for pain, redness and visual disturbance. Respiratory: Negative for cough, shortness of breath and wheezing. Cardiovascular: Negative for chest pain and leg swelling. Gastrointestinal: Negative for abdominal pain, nausea and vomiting. Genitourinary: Negative for difficulty urinating, discharge, dysuria, flank pain, frequency, hematuria, scrotal swelling and testicular pain. Musculoskeletal: Negative for back pain, joint swelling and myalgias. Skin: Negative for color change, rash and wound. Neurological: Negative for dizziness, tremors and numbness. Hematological: Negative for adenopathy. Does not bruise/bleed easily. There were no vitals taken for this visit.       PHYSICAL EXAM:  Constitutional: Patient in no acute distress; Neuro: alert and oriented to person place and time. Psych: Mood and affect normal.  Skin: Normal  Lungs: Respiratory effort normal  Cardiovascular:  Normal peripheral pulses  Abdomen: Soft, non-tender, non-distended with no CVA, flank pain, hepatosplenomegaly or hernia. Kidneys normal.  Bladder non-tender and not distended. Lymphatics: no palpable lymphadenopathy  Penis normal  Urethral meatus normal  Scrotal exam normal  Testicles normal bilaterally  Epididymis normal bilaterally  No evidence of inguinal hernia      Lab Results   Component Value Date    BUN 13 02/13/2013     Lab Results   Component Value Date    CREATININE 0.91 02/13/2013     No results found for: PSA    ASSESSMENT:  This is a 41 y.o. male with the following diagnoses:   Diagnosis Orders   1. Renal calculus           PLAN:  Patient instructed to drink at least 80 ounces of \"good fluids\" daily (water, juice, Gatoraide, milk) and to avoid/minimize intake of \"bad fluids\" (soda pop, coffee, tea, alcohol, energy drinks). Also advised to avoid excessive consumption of sodium and animal protein to decrease risk of recurrent kidney stones. We will see him back in 1 year with a repeat KUB.

## 2020-10-05 ENCOUNTER — TELEPHONE (OUTPATIENT)
Dept: UROLOGY | Age: 40
End: 2020-10-05

## 2020-10-15 ENCOUNTER — HOSPITAL ENCOUNTER (OUTPATIENT)
Dept: CT IMAGING | Age: 40
Discharge: HOME | End: 2020-10-17
Payer: COMMERCIAL

## 2020-10-15 ENCOUNTER — HOSPITAL ENCOUNTER (OUTPATIENT)
Dept: GENERAL RADIOLOGY | Age: 40
Discharge: HOME | End: 2020-10-17
Payer: COMMERCIAL

## 2020-10-15 ENCOUNTER — HOSPITAL ENCOUNTER (OUTPATIENT)
Age: 40
Discharge: HOME | End: 2020-10-17
Payer: COMMERCIAL

## 2020-10-15 ENCOUNTER — OFFICE VISIT (OUTPATIENT)
Dept: UROLOGY | Age: 40
End: 2020-10-15
Payer: COMMERCIAL

## 2020-10-15 VITALS
DIASTOLIC BLOOD PRESSURE: 74 MMHG | SYSTOLIC BLOOD PRESSURE: 110 MMHG | BODY MASS INDEX: 25.48 KG/M2 | WEIGHT: 172 LBS | HEIGHT: 69 IN

## 2020-10-15 PROCEDURE — 74018 RADEX ABDOMEN 1 VIEW: CPT

## 2020-10-15 PROCEDURE — G8484 FLU IMMUNIZE NO ADMIN: HCPCS | Performed by: UROLOGY

## 2020-10-15 PROCEDURE — 99214 OFFICE O/P EST MOD 30 MIN: CPT | Performed by: UROLOGY

## 2020-10-15 PROCEDURE — G8419 CALC BMI OUT NRM PARAM NOF/U: HCPCS | Performed by: UROLOGY

## 2020-10-15 PROCEDURE — G8427 DOCREV CUR MEDS BY ELIG CLIN: HCPCS | Performed by: UROLOGY

## 2020-10-15 PROCEDURE — 74176 CT ABD & PELVIS W/O CONTRAST: CPT

## 2020-10-15 PROCEDURE — 4004F PT TOBACCO SCREEN RCVD TLK: CPT | Performed by: UROLOGY

## 2020-10-15 ASSESSMENT — ENCOUNTER SYMPTOMS
COUGH: 0
WHEEZING: 0
ABDOMINAL PAIN: 0
EYE REDNESS: 0
COLOR CHANGE: 0
VOMITING: 0
EYE PAIN: 0
NAUSEA: 0
SHORTNESS OF BREATH: 0
BACK PAIN: 0

## 2020-10-15 NOTE — PROGRESS NOTES
HPI:          Patient is a 45 y.o. male in no acute distress. He is alert and oriented to person, place, and time. History  12/2018 cysto left stent placement     3/15/2019 left ESWL with Dr. Mely Abbott     4/2019 CT showed several distal left ureteral stones. Noreene Shouts is also small stones in left kidney. Noreene Shouts is also a nonobstructing stone in the right kidney.       4/18/2019 left HLL     Currently  Patient is here today for 1 year follow-up. Patient is doing well. No recent gross hematuria dysuria. Patient did have a recent KUB. This film was independently reviewed today. This does not show any significant  calcifications. Patient has had no gross hematuria or dysuria. He reports no abdominal pain. He does have bilateral flank pain. He is concerned he may have a small stone. I did tell him that KUB can miss small stones. Patient has had no fevers.     Past Medical History:   Diagnosis Date    Adult behavior problem    Lala Mouse, kidney     Wears glasses      Past Surgical History:   Procedure Laterality Date    APPENDECTOMY      CYSTOSCOPY      CYSTOSCOPY Left 04/18/2019    per Dr. Johana Kenny Left 4/18/2019    CYSTOSCOPY URETEROSCOPY LASER- LEFT HLL, WITH LEFT STENT PLACEMENT performed by Jamila Gonzalez MD at [facility] [address] / Fred Fix / Tiesha Resendez Left 4/25/2019    CYSTOSCOPY STENT REMOVAL performed by Jamila Gonzalez MD at [facility] CYSTOURETHROSCOPY/STENT [address]       Outpatient Encounter Medications as of 10/15/2020   Medication Sig Dispense Refill    [DISCONTINUED] HYDROcodone-acetaminophen (NORCO) 5-325 MG per tablet Take 1 tablet every 6 hours as needed for pain  0    [DISCONTINUED] levofloxacin (LEVAQUIN) 500 MG tablet TAKE 1 TABLET EVERY MORNING  0    [DISCONTINUED] ondansetron (ZOFRAN-ODT) 4 MG disintegrating tablet Dissolve 1 tablet in mouth every 6 hours as needed for nausea/vomiting  0    [DISCONTINUED] oxybutynin (DITROPAN) 5 MG tablet TAKE 1 TABLET TWICE DAILY FOR BLADDER spasms  1    [DISCONTINUED] tamsulosin (FLOMAX) 0.4 MG capsule Take 1 capsule by mouth daily Take to facilitate stone passage (Patient not taking: Reported on 10/15/2020) 30 capsule 0     No facility-administered encounter medications on file as of 10/15/2020. No current outpatient medications on file prior to visit. No current facility-administered medications on file prior to visit. Amoxicillin  Family History   Problem Relation Age of Onset    Crohn's Disease Father      Social History     Tobacco Use   Smoking Status Current Some Day Smoker    Types: Cigarettes   Smokeless Tobacco Current User    Types: Snuff, Chew       Social History     Substance and Sexual Activity   Alcohol Use Not Currently       Review of Systems   Constitutional: Negative for appetite change, chills and fever. Eyes: Negative for pain, redness and visual disturbance. Respiratory: Negative for cough, shortness of breath and wheezing. Cardiovascular: Negative for chest pain and leg swelling. Gastrointestinal: Negative for abdominal pain, nausea and vomiting. Genitourinary: Negative for difficulty urinating, discharge, dysuria, flank pain, frequency, hematuria, scrotal swelling and testicular pain. Musculoskeletal: Negative for back pain, joint swelling and myalgias. Skin: Negative for color change, rash and wound. Neurological: Negative for dizziness, tremors and numbness. Hematological: Negative for adenopathy. Does not bruise/bleed easily. /74 (Site: Left Upper Arm, Position: Sitting, Cuff Size: Medium Adult)   Ht 5' 9\" (1.753 m)   Wt 172 lb (78 kg)   BMI 25.40 kg/m²       PHYSICAL EXAM:  Constitutional: Patient in no acute distress; Neuro: alert and oriented to person place and time.     Psych: Mood and affect normal.  Skin: Normal  Lungs: Respiratory effort normal  Cardiovascular:  Normal peripheral pulses  Abdomen: Soft, non-tender, non-distended with no CVA, flank pain, hepatosplenomegaly or hernia. Kidneys normal.  Bladder non-tender and not distended. Lymphatics: no palpable lymphadenopathy  Penis normal  Urethral meatus normal  Scrotal exam normal  Testicles normal bilaterally  Epididymis normal bilaterally  No evidence of inguinal hernia        Lab Results   Component Value Date    BUN 13 02/13/2013     Lab Results   Component Value Date    CREATININE 0.91 02/13/2013     No results found for: PSA    ASSESSMENT:  This is a 45 y.o. male with the following diagnoses:   Diagnosis Orders   1. Ureteral calculus  CT ABDOMEN PELVIS WO CONTRAST   2. Renal colic  CT ABDOMEN PELVIS WO CONTRAST       PLAN:  We will plan for a CT stone protocol now. We will have him back to discuss the results.

## 2020-11-12 ENCOUNTER — OFFICE VISIT (OUTPATIENT)
Dept: UROLOGY | Age: 40
End: 2020-11-12
Payer: COMMERCIAL

## 2020-11-12 VITALS
HEIGHT: 69 IN | WEIGHT: 175 LBS | BODY MASS INDEX: 25.92 KG/M2 | DIASTOLIC BLOOD PRESSURE: 80 MMHG | SYSTOLIC BLOOD PRESSURE: 132 MMHG

## 2020-11-12 PROCEDURE — 4004F PT TOBACCO SCREEN RCVD TLK: CPT | Performed by: PHYSICIAN ASSISTANT

## 2020-11-12 PROCEDURE — G8484 FLU IMMUNIZE NO ADMIN: HCPCS | Performed by: PHYSICIAN ASSISTANT

## 2020-11-12 PROCEDURE — G8427 DOCREV CUR MEDS BY ELIG CLIN: HCPCS | Performed by: PHYSICIAN ASSISTANT

## 2020-11-12 PROCEDURE — G8419 CALC BMI OUT NRM PARAM NOF/U: HCPCS | Performed by: PHYSICIAN ASSISTANT

## 2020-11-12 PROCEDURE — 99213 OFFICE O/P EST LOW 20 MIN: CPT | Performed by: PHYSICIAN ASSISTANT

## 2020-11-12 ASSESSMENT — ENCOUNTER SYMPTOMS
EYE REDNESS: 0
COUGH: 0
COLOR CHANGE: 0
ABDOMINAL PAIN: 0
CONSTIPATION: 0
WHEEZING: 0
NAUSEA: 0
SHORTNESS OF BREATH: 0
BACK PAIN: 0
VOMITING: 0

## 2020-11-12 NOTE — PROGRESS NOTES
HPI:      Patient is a 45 y.o. male in no acute distress. He is alert and oriented to person, place, and time. History  12/2018 cysto left stent placement     3/15/2019 left ESWL with Dr. Rishi Staples     4/2019 CT showed several distal left ureteral stones. Arabella Breana is also small stones in left kidney. Arabella Breana is also a nonobstructing stone in the right kidney.       4/18/2019 left HLL     Today:  Patient is here today for follow-up flank pain. Patient had concern at last visit that he had a ureteral stone. At last visit we did order a CT stone protocol. This imaging was independently reviewed showing a 2 mm left renal calculi. No right-sided calculi seen. There are no ureteral or bladder calculi. No hydronephrosis. Patient denies any fever, chills, dysuria, gross hematuria. He no longer has any flank pain. He states that he has stopped drinking [employer]. He does drink coffee during the day. Past Medical History:   Diagnosis Date    Adult behavior problem    [place] Mince, kidney     Wears glasses      Past Surgical History:   Procedure Laterality Date    APPENDECTOMY      CYSTOSCOPY      CYSTOSCOPY Left 04/18/2019    per Dr. Hawa Barros Left 4/18/2019    CYSTOSCOPY URETEROSCOPY LASER- LEFT HLL, WITH LEFT STENT PLACEMENT performed by Bronson Meyers MD at [facility] / [facility] ER / Al Arnett Left 4/25/2019    CYSTOSCOPY STENT REMOVAL performed by Bronson Meyers MD at [address] [place] [address] CYSTOURETHROSCOPY/STENT [address] [place] [address]       No outpatient encounter medications on file as of 11/12/2020. No facility-administered encounter medications on file as of 11/12/2020. No current outpatient medications on file prior to visit. No current facility-administered medications on file prior to visit.       Amoxicillin  Family History   Problem Relation Age of Onset    Crohn's Disease oz (2-2.5L) of water per day to stay adequately hydrated     2) AVOID/LIMIT intake of \"bad fluids\": soda/pop, coffee, tea, alcohol, energy drinks, any beverage with caffeine can act to dehydrate you       \"BAD FLUIDS\" DO NOT COUNT TOWARD THE 65-80oz of water    3) REDUCE consumption of sodium/salt - DO NOT salt your food, read labels (lunch meats, canned soups, prepared meals are high in salt), choose low salt options    4) DO NOT EAT animal protein/meat more than 2 meals a day - this includes red meat, pork, poultry and fish    See him in 1 year with a KUB    He develops any new or worsening urinary symptoms, dysuria, hematuria, flank pain he will contact our office sooner

## 2020-11-12 NOTE — PATIENT INSTRUCTIONS
SURVEY:    You may be receiving a survey from [employer] regarding your visit today. Please complete the survey to enable us to provide the highest quality of care to you and your family. If you cannot score us a very good on any question, please call the office to discuss how we could have made your experience a very good one. Thank you.     STONE PREVENTION    To prevent future stone formation:    1) DO drink ~65-80 oz (2-2.5L) of water per day to stay adequately hydrated     2) AVOID/LIMIT intake of \"bad fluids\": soda/pop, coffee, tea, alcohol, energy drinks, any beverage with caffeine can act to dehydrate you       \"BAD FLUIDS\" DO NOT COUNT TOWARD THE 65-80oz of water    3) REDUCE consumption of sodium/salt - DO NOT salt your food, read labels (lunch meats, canned soups, prepared meals are high in salt), choose low salt options    4) DO NOT EAT animal protein/meat more than 2 meals a day - this includes red meat, pork, poultry and fish

## 2021-01-04 NOTE — OP NOTE
[address]                                OPERATIVE REPORT    PATIENT NAME: Marylu Good                      :        1980  MED REC NO:   [id number]                              ROOM:  ACCOUNT NO:   [de-identified]                           ADMIT DATE: 2019  PROVIDER:     Derik Ceballos    DATE OF PROCEDURE:  2019    SURGEON:  Dr. Derik Ceballos. ASSISTANT:  None. PREOPERATIVE DIAGNOSIS:  Left ureteral calculus. POSTOPERATIVE DIAGNOSIS:  Left ureteral calculus. PROCEDURE PERFORMED:  Stent removal via string. ANESTHESIA:  MAC.    COMPLICATIONS:  None. ESTIMATED BLOOD LOSS:  Minimal.    PROSTHESIS:  None. SPECIMEN:  Stent discarded. DISPOSITION:  Stable. FINDINGS:  Stent on string. INDICATIONS:  The patient is a 43-year-old male who underwent a recent  left holmium laser lithotripsy. We did attempt to remove his stent via  string in the office. He was intolerable and could not handle the pain  or discomfort associated with this, so he is here now for removal.   Stent via string under MAC anesthesia. DESCRIPTION OF PROCEDURE:  The patient was taken back to the operating  room after informed consent including all risks, benefits, and  alternatives were obtained. The patient remained on his gurney. He was  induced under MAC anesthesia. He was then ungowned. Stent on string  was identified. We did remove two bandages from the top of his penis. We were then able to remove the stent. This came out in its entirety  and without difficulty. We did discard this. He was then awoken from  MAC anesthesia, remained on his gurney, and taken to the PACU in  satisfactory condition by Nursing and Anesthesia teams. PLAN:  He will be discharged home per PACU criteria and follow up with  us in six weeks with a repeat KUB.         Cookie Huertas    D: 2019 11:03:39 [Negative] : Genitourinary

## 2021-11-04 ENCOUNTER — OFFICE VISIT (OUTPATIENT)
Dept: UROLOGY | Age: 41
End: 2021-11-04
Payer: COMMERCIAL

## 2021-11-04 ENCOUNTER — HOSPITAL ENCOUNTER (OUTPATIENT)
Dept: GENERAL RADIOLOGY | Age: 41
Discharge: HOME | End: 2021-11-06
Payer: COMMERCIAL

## 2021-11-04 ENCOUNTER — HOSPITAL ENCOUNTER (OUTPATIENT)
Age: 41
Discharge: HOME | End: 2021-11-06
Payer: COMMERCIAL

## 2021-11-04 ENCOUNTER — TELEPHONE (OUTPATIENT)
Dept: UROLOGY | Age: 41
End: 2021-11-04

## 2021-11-04 VITALS
WEIGHT: 180 LBS | BODY MASS INDEX: 26.66 KG/M2 | DIASTOLIC BLOOD PRESSURE: 88 MMHG | HEIGHT: 69 IN | SYSTOLIC BLOOD PRESSURE: 132 MMHG

## 2021-11-04 DIAGNOSIS — N20.0 KIDNEY STONES: Primary | ICD-10-CM

## 2021-11-04 DIAGNOSIS — N20.0 KIDNEY STONES: ICD-10-CM

## 2021-11-04 PROCEDURE — 99213 OFFICE O/P EST LOW 20 MIN: CPT | Performed by: PHYSICIAN ASSISTANT

## 2021-11-04 PROCEDURE — 74018 RADEX ABDOMEN 1 VIEW: CPT

## 2021-11-04 PROCEDURE — G8419 CALC BMI OUT NRM PARAM NOF/U: HCPCS | Performed by: PHYSICIAN ASSISTANT

## 2021-11-04 PROCEDURE — 4004F PT TOBACCO SCREEN RCVD TLK: CPT | Performed by: PHYSICIAN ASSISTANT

## 2021-11-04 PROCEDURE — G8484 FLU IMMUNIZE NO ADMIN: HCPCS | Performed by: PHYSICIAN ASSISTANT

## 2021-11-04 PROCEDURE — G8427 DOCREV CUR MEDS BY ELIG CLIN: HCPCS | Performed by: PHYSICIAN ASSISTANT

## 2021-11-04 ASSESSMENT — ENCOUNTER SYMPTOMS
ABDOMINAL PAIN: 0
CONSTIPATION: 0
EYE REDNESS: 0
WHEEZING: 0
SHORTNESS OF BREATH: 0
BACK PAIN: 0
COLOR CHANGE: 0
NAUSEA: 0
VOMITING: 0
COUGH: 0

## 2021-11-04 NOTE — TELEPHONE ENCOUNTER
Ricky Weston would like the diet of foods/drinks he is not to eat. Send response via email or [employer].

## 2021-11-04 NOTE — PROGRESS NOTES
HPI:      Patient is a 37 y.o. male in no acute distress. He is alert and oriented to person, place, and time. History  12/2018 cysto left stent placement     3/15/2019 left ESWL with Dr. Luberta Meigs     4/2019 CT showed several distal left ureteral stones. Shelda Clock is also small stones in left kidney. Shelda Clock is also a nonobstructing stone in the right kidney.    4/18/2019 left HLL     Today:  Patient is here today for annual follow-up of kidney stones. Patient did have a KUB prior to visit today which independently reviewed showing  left renal calculi measuring approximately 2-3 mm. Patient has back and right leg pain. Patient had left-sided pain which shot to her shoulder in 2 weeks ago. This pain that he is having does not feel like prior kidney stone pain. He does have occasional dizziness but he thinks this is secondary to significant amount of coffee he was tased testing. Past Medical History:   Diagnosis Date    Adult behavior problem    Vanna Gonzalez, kidney     Wears glasses      Past Surgical History:   Procedure Laterality Date    APPENDECTOMY      CYSTOSCOPY      CYSTOSCOPY Left 04/18/2019    per Dr. Nixon Villarreal Left 4/18/2019    CYSTOSCOPY URETEROSCOPY LASER- LEFT HLL, WITH LEFT STENT PLACEMENT performed by John Guardado MD at [facility] / [address] [facility] [address] / Andrés Kisha Left 4/25/2019    CYSTOSCOPY STENT REMOVAL performed by John Guardado MD at [facility] CYSTOURETHROSCOPY/STENT [address]       No outpatient encounter medications on file as of 11/4/2021. No facility-administered encounter medications on file as of 11/4/2021. No current outpatient medications on file prior to visit. No current facility-administered medications on file prior to visit.      Amoxicillin and Nsaids  Family History   Problem Relation Age of Onset    Crohn's Disease Father      Social History     Tobacco Use   Smoking Status Current Some Day Smoker    Types: Cigarettes   Smokeless Tobacco Current User    Types: Snuff, Chew       Social History     Substance and Sexual Activity   Alcohol Use Not Currently       Review of Systems   Constitutional: Negative for appetite change, chills and fever. Eyes: Negative for redness and visual disturbance. Respiratory: Negative for cough, shortness of breath and wheezing. Cardiovascular: Negative for chest pain and leg swelling. Gastrointestinal: Negative for abdominal pain, constipation, nausea and vomiting. Genitourinary: Negative for decreased urine volume, difficulty urinating, discharge, dysuria, enuresis, flank pain, frequency, hematuria, penile pain, scrotal swelling, testicular pain and urgency. Musculoskeletal: Negative for back pain, joint swelling and myalgias. Skin: Negative for color change, rash and wound. Neurological: Negative for dizziness, tremors and numbness. Hematological: Negative for adenopathy. Does not bruise/bleed easily. /88 (Site: Right Upper Arm, Position: Sitting, Cuff Size: Medium Adult)   Ht 5' 9\" (1.753 m)   Wt 180 lb (81.6 kg)   BMI 26.58 kg/m²       PHYSICAL EXAM:  Constitutional: Patient in no acute distress; Neuro: alert and oriented to person place and time. Psych: Mood and affect normal.  Lungs: Respiratory effort normal  Abdomen: Soft, non-tender, non-distended  Rectal: Deferred      Lab Results   Component Value Date    BUN 13 02/13/2013     Lab Results   Component Value Date    CREATININE 0.91 02/13/2013     No results found for: PSA    ASSESSMENT:   Diagnosis Orders   1. Kidney stones  XR ABDOMEN (KUB) (SINGLE AP VIEW)     PLAN:  Patient instructed to drink at least 80 ounces of \"good fluids\" daily (water, juice, Gatoraide, milk) and to avoid/minimize intake of \"bad fluids\" (soda pop, coffee, tea, alcohol, energy drinks).  Also advised to avoid excessive consumption of sodium and animal protein to decrease risk of recurrent kidney stones.     Follow-up in 1 year with KUB

## 2021-11-04 NOTE — PATIENT INSTRUCTIONS
STONE PREVENTION    To prevent future stone formation:    1) DO drink ~65-80 oz (2-2.5L) of water per day to stay adequately hydrated     2) AVOID/LIMIT intake of \"bad fluids\": soda/pop, coffee, tea, alcohol, energy drinks, any beverage with caffeine can act to dehydrate you       \"BAD FLUIDS\" DO NOT COUNT TOWARD THE 65-80oz of water    3) REDUCE consumption of sodium/salt - DO NOT salt your food, read labels (lunch meats, canned soups, prepared meals are high in salt), choose low salt options    4) DO NOT EAT animal protein/meat more than 2 meals a day - this includes red meat, pork, poultry and fish      SURVEY:    You may be receiving a survey from [employer] regarding your visit today. Please complete the survey to enable us to provide the highest quality of care to you and your family. If you cannot score us a very good on any question, please call the office to discuss how we could of made your experience a very good one. Thank you.

## 2022-11-02 DIAGNOSIS — N20.0 KIDNEY STONES: Primary | ICD-10-CM

## 2022-11-10 ENCOUNTER — OFFICE VISIT (OUTPATIENT)
Dept: UROLOGY | Age: 42
End: 2022-11-10
Payer: COMMERCIAL

## 2022-11-10 ENCOUNTER — HOSPITAL ENCOUNTER (OUTPATIENT)
Age: 42
Discharge: HOME | End: 2022-11-12
Payer: COMMERCIAL

## 2022-11-10 ENCOUNTER — HOSPITAL ENCOUNTER (OUTPATIENT)
Dept: GENERAL RADIOLOGY | Age: 42
Discharge: HOME | End: 2022-11-12
Payer: COMMERCIAL

## 2022-11-10 VITALS
HEIGHT: 69 IN | DIASTOLIC BLOOD PRESSURE: 88 MMHG | WEIGHT: 188 LBS | BODY MASS INDEX: 27.85 KG/M2 | SYSTOLIC BLOOD PRESSURE: 142 MMHG

## 2022-11-10 DIAGNOSIS — N20.0 KIDNEY STONES: Primary | ICD-10-CM

## 2022-11-10 DIAGNOSIS — N20.0 KIDNEY STONES: ICD-10-CM

## 2022-11-10 PROCEDURE — 74018 RADEX ABDOMEN 1 VIEW: CPT

## 2022-11-10 PROCEDURE — G8484 FLU IMMUNIZE NO ADMIN: HCPCS | Performed by: PHYSICIAN ASSISTANT

## 2022-11-10 PROCEDURE — G8427 DOCREV CUR MEDS BY ELIG CLIN: HCPCS | Performed by: PHYSICIAN ASSISTANT

## 2022-11-10 PROCEDURE — 4004F PT TOBACCO SCREEN RCVD TLK: CPT | Performed by: PHYSICIAN ASSISTANT

## 2022-11-10 PROCEDURE — G8419 CALC BMI OUT NRM PARAM NOF/U: HCPCS | Performed by: PHYSICIAN ASSISTANT

## 2022-11-10 PROCEDURE — 99213 OFFICE O/P EST LOW 20 MIN: CPT | Performed by: PHYSICIAN ASSISTANT

## 2022-11-10 ASSESSMENT — ENCOUNTER SYMPTOMS
BACK PAIN: 0
SHORTNESS OF BREATH: 0
COLOR CHANGE: 0
EYE REDNESS: 0
CONSTIPATION: 0
VOMITING: 0
NAUSEA: 0
ABDOMINAL PAIN: 0
WHEEZING: 0
COUGH: 0

## 2022-11-10 NOTE — PROGRESS NOTES
HPI:      Patient is a 40 y.o. male in no acute distress. He is alert and oriented to person, place, and time. History  12/2018 cysto left stent placement     3/15/2019 left ESWL with Dr. Rachael Stone     4/2019 CT showed several distal left ureteral stones. There is also small stones in left kidney. There is also a nonobstructing stone in the right kidney. 4/18/2019 left HLL     Today:  Patient is here today for annual follow-up of kidney stones. Patient did have a KUB prior to visit today which independently reviewed showing punctate right renal calculi, difficult to assess left kidney secondary to overlying bowel contents. Patient states that he passed a small stone recently. He is currently in no pain. He denies any fever, chills, flank pain, dysuria. Patient states that he drinks plenty of water but continues to drink coffee and occasional dark soda. Patient states that he does eat salty foods but tries to limit them. Past Medical History:   Diagnosis Date    Adult behavior problem     Stone, kidney     Wears glasses      Past Surgical History:   Procedure Laterality Date    APPENDECTOMY      CYSTOSCOPY      CYSTOSCOPY Left 04/18/2019    per Dr. Ruiz Moreno Left 4/18/2019    CYSTOSCOPY URETEROSCOPY LASER- LEFT HLL, WITH LEFT STENT PLACEMENT performed by Tanja Carnes MD at [place] [id number] / Carlean Schirmer / Ganesh Obando Left 4/25/2019    CYSTOSCOPY STENT REMOVAL performed by Tanja Carnes MD at [address]       No outpatient encounter medications on file as of 11/10/2022. No facility-administered encounter medications on file as of 11/10/2022. No current outpatient medications on file prior to visit. No current facility-administered medications on file prior to visit.      Amoxicillin and Nsaids  Family History   Problem Relation Age of Onset    Crohn's Disease Father      Social History     Tobacco Use   Smoking Status Some Days    Types: Cigarettes   Smokeless Tobacco Current    Types: Snuff, Chew       Social History     Substance and Sexual Activity   Alcohol Use Not Currently       Review of Systems   Constitutional:  Negative for appetite change, chills and fever. Eyes:  Negative for redness and visual disturbance. Respiratory:  Negative for cough, shortness of breath and wheezing. Cardiovascular:  Negative for chest pain and leg swelling. Gastrointestinal:  Negative for abdominal pain, constipation, nausea and vomiting. Genitourinary:  Negative for decreased urine volume, difficulty urinating, dysuria, enuresis, flank pain, frequency, hematuria, penile discharge, penile pain, scrotal swelling, testicular pain and urgency. Musculoskeletal:  Negative for back pain, joint swelling and myalgias. Skin:  Negative for color change, rash and wound. Neurological:  Negative for dizziness, tremors and numbness. Hematological:  Negative for adenopathy. Does not bruise/bleed easily. BP (!) 142/88   Ht 5' 9\" (1.753 m)   Wt 188 lb (85.3 kg)   BMI 27.76 kg/m²       PHYSICAL EXAM:  Constitutional: Patient in no acute distress; Neuro: alert and oriented to person place and time. Psych: Mood and affect normal.  Lungs: Respiratory effort normal  Abdomen: Soft, non-tender, non-distended  Rectal: deferred       Lab Results   Component Value Date    BUN 13 02/13/2013     Lab Results   Component Value Date    CREATININE 0.91 02/13/2013     No results found for: PSA    ASSESSMENT:   Diagnosis Orders   1. Kidney stones  XR ABDOMEN (KUB) (SINGLE AP VIEW)            PLAN:  Patient instructed to drink at least 80 ounces of \"good fluids\" daily (water, juice, Gatoraide, milk) and to avoid/minimize intake of \"bad fluids\" (soda pop, coffee, tea, alcohol, energy drinks).  Also advised to avoid excessive consumption of sodium and animal protein to decrease risk of recurrent kidney stones.     Follow-up in 1 year with KUB

## 2022-11-10 NOTE — PATIENT INSTRUCTIONS
STONE PREVENTION    To prevent future stone formation:    1) DO drink ~65-80 oz (2-2.5L) of water per day to stay adequately hydrated     2) AVOID/LIMIT intake of \"bad fluids\": soda/pop, coffee, tea, alcohol, energy drinks, any beverage with caffeine can act to dehydrate you       \"BAD FLUIDS\" DO NOT COUNT TOWARD THE 65-80oz of water    3) REDUCE consumption of sodium/salt - DO NOT salt your food, read labels (lunch meats, canned soups, prepared meals are high in salt), choose low salt options    4) DO NOT EAT animal protein/meat more than 2 meals a day - this includes red meat, pork, poultry and fish    SURVEY:    You may be receiving a survey from [employer] regarding your visit today. Please complete the survey to enable us to provide the highest quality of care to you and your family. If you cannot score us a very good on any question, please call the office to discuss how we could have made your experience a very good one. Thank you.

## 2023-11-15 DIAGNOSIS — N20.0 KIDNEY STONES: Primary | ICD-10-CM

## 2023-11-16 ENCOUNTER — HOSPITAL ENCOUNTER (OUTPATIENT)
Age: 43
Discharge: HOME | End: 2023-11-18
Payer: MEDICARE

## 2023-11-16 ENCOUNTER — OFFICE VISIT (OUTPATIENT)
Dept: UROLOGY | Age: 43
End: 2023-11-16
Payer: MEDICARE

## 2023-11-16 ENCOUNTER — HOSPITAL ENCOUNTER (OUTPATIENT)
Dept: GENERAL RADIOLOGY | Age: 43
Discharge: HOME | End: 2023-11-18
Payer: MEDICARE

## 2023-11-16 VITALS — WEIGHT: 192 LBS | SYSTOLIC BLOOD PRESSURE: 138 MMHG | BODY MASS INDEX: 28.35 KG/M2 | DIASTOLIC BLOOD PRESSURE: 84 MMHG

## 2023-11-16 DIAGNOSIS — N20.0 KIDNEY STONES: Primary | ICD-10-CM

## 2023-11-16 DIAGNOSIS — N20.0 KIDNEY STONES: ICD-10-CM

## 2023-11-16 DIAGNOSIS — Z75.8 DOES NOT HAVE PRIMARY CARE PROVIDER: ICD-10-CM

## 2023-11-16 PROCEDURE — 74018 RADEX ABDOMEN 1 VIEW: CPT

## 2023-11-16 PROCEDURE — G8484 FLU IMMUNIZE NO ADMIN: HCPCS | Performed by: PHYSICIAN ASSISTANT

## 2023-11-16 PROCEDURE — 99213 OFFICE O/P EST LOW 20 MIN: CPT | Performed by: PHYSICIAN ASSISTANT

## 2023-11-16 PROCEDURE — G8427 DOCREV CUR MEDS BY ELIG CLIN: HCPCS | Performed by: PHYSICIAN ASSISTANT

## 2023-11-16 PROCEDURE — G8419 CALC BMI OUT NRM PARAM NOF/U: HCPCS | Performed by: PHYSICIAN ASSISTANT

## 2023-11-16 PROCEDURE — 4004F PT TOBACCO SCREEN RCVD TLK: CPT | Performed by: PHYSICIAN ASSISTANT

## 2023-11-16 ASSESSMENT — ENCOUNTER SYMPTOMS
BACK PAIN: 0
EYE REDNESS: 0
WHEEZING: 0
VOMITING: 0
NAUSEA: 0
COLOR CHANGE: 0
COUGH: 0
CONSTIPATION: 0
SHORTNESS OF BREATH: 0
ABDOMINAL PAIN: 0

## 2023-11-16 NOTE — PROGRESS NOTES
HPI:      Patient is a 44 y.o. male in no acute distress. He is alert and oriented to person, place, and time. History  12/2018 cysto left stent placement     3/15/2019 left ESWL with Dr. Eugenio Dyer     4/2019 CT showed several distal left ureteral stones. There is also small stones in left kidney. There is also a nonobstructing stone in the right kidney. 4/18/2019 left HLL     Today:  Patient is here today for annual follow-up of kidney stones. Patient did have a KUB prior to visit today which independently reviewed showing possible right renal calculi, however bilateral renal shadows are difficult to assess secondary to overlying bowel contents. Patient denies any spontaneous stone passage over the last year. He is currently in no pain. He denies any fever, chills, flank pain, dysuria. Patient does not have a primary care. He would like a referral to reestablish care as his primary care has retired. Past Medical History:   Diagnosis Date    Adult behavior problem     Stone, kidney     Wears glasses      Past Surgical History:   Procedure Laterality Date    APPENDECTOMY      CYSTOSCOPY      CYSTOSCOPY Left 04/18/2019    per Dr. Edmundo Celaya Left 4/18/2019    CYSTOSCOPY URETEROSCOPY LASER- LEFT HLL, WITH LEFT STENT PLACEMENT performed by Monique Blanchard MD at [address] [facility] Fanny Fischer / Katheryne Moritz / Jack Massey Left 4/25/2019    CYSTOSCOPY STENT REMOVAL performed by Monique Blanchard MD at [address] [facility]       No outpatient encounter medications on file as of 11/16/2023. No facility-administered encounter medications on file as of 11/16/2023. No current outpatient medications on file prior to visit. No current facility-administered medications on file prior to visit.      Amoxicillin and Nsaids  Family History   Problem Relation Age of Onset

## 2023-11-16 NOTE — PATIENT INSTRUCTIONS
STONE PREVENTION    To prevent future stone formation:    1) DO drink ~65-80 oz (2-2.5L) of water per day to stay adequately hydrated     2) AVOID/LIMIT intake of \"bad fluids\": soda/pop, coffee, tea, alcohol, energy drinks, any beverage with caffeine can act to dehydrate you       \"BAD FLUIDS\" DO NOT COUNT TOWARD THE 65-80oz of water    3) REDUCE consumption of sodium/salt - DO NOT salt your food, read labels (lunch meats, canned soups, prepared meals are high in salt), choose low salt options    4) DO NOT EAT animal protein/meat more than 2 meals a day - this includes red meat, pork, poultry and fish    SURVEY:    You may be receiving a survey from [employer] regarding your visit today. Please complete the survey to enable us to provide the highest quality of care to you and your family. If you cannot score us a very good on any question, please call the office to discuss how we could have made your experience a very good one. Thank you.

## 2024-11-11 ENCOUNTER — HOSPITAL ENCOUNTER (OUTPATIENT)
Age: 44
Discharge: HOME | End: 2024-11-13
Payer: MEDICARE

## 2024-11-11 ENCOUNTER — HOSPITAL ENCOUNTER (OUTPATIENT)
Dept: GENERAL RADIOLOGY | Age: 44
Discharge: HOME | End: 2024-11-13
Payer: MEDICARE

## 2024-11-11 DIAGNOSIS — N20.0 KIDNEY STONES: ICD-10-CM

## 2024-11-11 PROCEDURE — 74018 RADEX ABDOMEN 1 VIEW: CPT

## 2024-11-14 ENCOUNTER — OFFICE VISIT (OUTPATIENT)
Dept: UROLOGY | Age: 44
End: 2024-11-14
Payer: MEDICARE

## 2024-11-14 VITALS
HEIGHT: 69 IN | SYSTOLIC BLOOD PRESSURE: 148 MMHG | WEIGHT: 191 LBS | BODY MASS INDEX: 28.29 KG/M2 | DIASTOLIC BLOOD PRESSURE: 80 MMHG

## 2024-11-14 DIAGNOSIS — K59.09 OTHER CONSTIPATION: ICD-10-CM

## 2024-11-14 DIAGNOSIS — N20.0 KIDNEY STONES: Primary | ICD-10-CM

## 2024-11-14 PROCEDURE — G8419 CALC BMI OUT NRM PARAM NOF/U: HCPCS | Performed by: PHYSICIAN ASSISTANT

## 2024-11-14 PROCEDURE — G8484 FLU IMMUNIZE NO ADMIN: HCPCS | Performed by: PHYSICIAN ASSISTANT

## 2024-11-14 PROCEDURE — 4004F PT TOBACCO SCREEN RCVD TLK: CPT | Performed by: PHYSICIAN ASSISTANT

## 2024-11-14 PROCEDURE — G8427 DOCREV CUR MEDS BY ELIG CLIN: HCPCS | Performed by: PHYSICIAN ASSISTANT

## 2024-11-14 PROCEDURE — 99214 OFFICE O/P EST MOD 30 MIN: CPT | Performed by: PHYSICIAN ASSISTANT

## 2024-11-14 RX ORDER — POLYETHYLENE GLYCOL 3350 17 G/17G
17 POWDER, FOR SOLUTION ORAL DAILY
Qty: 1530 G | Refills: 5 | Status: SHIPPED | OUTPATIENT
Start: 2024-11-14 | End: 2026-05-08

## 2024-11-14 NOTE — PATIENT INSTRUCTIONS
FOR CONSTIPATION    It is very important to have regular, soft, daily bowel movements, because it WILL improve your urinary symptoms.    Take Miralax (or generic equivalent) 17g once daily (one capful). Take every day, DO NOT skip days, as it must be taken daily in order to be effective. DO NOT take just \"as needed\". It is safe to take long term and is recommended for your symptoms.  If one dose daily causes loose stools/diarrhea, decrease to 1/2 capful or 1/4 capful. If you cannot tolerate this medication, please notify our office.     If one dose daily of Miralax is not sufficient to produce a soft, easy to pass daily stool, you may also add an over-the-counter stool softener capsule. For example: colace (docusate).     For Miralax to have maximal effectiveness, be sure to increase your water intake - aim for 80 oz daily unless you are on a fluid-restriction from another provider.     STONE PREVENTION    To prevent future stone formation:    1) DO drink ~65-80 oz (2-2.5L) of water per day to stay adequately hydrated     2) AVOID/LIMIT intake of \"bad fluids\": soda/pop, coffee, tea, alcohol, energy drinks, any beverage with caffeine can act to dehydrate you       \"BAD FLUIDS\" DO NOT COUNT TOWARD THE 65-80oz of water    3) REDUCE consumption of sodium/salt - DO NOT salt your food, read labels (lunch meats, canned soups, prepared meals are high in salt), choose low salt options    4) DO NOT EAT animal protein/meat more than 2 meals a day - this includes red meat, pork, poultry and fish    SURVEY:    You may be receiving a survey from [employer] regarding your visit today.    Please complete the survey to enable us to provide the highest quality of care to you and your family.    If you cannot score us a very good on any question, please call the office to discuss how we could have made your experience a very good one.    Thank you.

## 2024-11-14 NOTE — PROGRESS NOTES
HPI:      Patient is a 44 y.o. male in no acute distress.  He is alert and oriented to person, place, and time.       History  12/2018 cysto left stent placement     3/15/2019 left ESWL with Dr. Alas     4/2019 CT showed several distal left ureteral stones.  There is also small stones in left kidney.  There is also a nonobstructing stone in the right kidney.      4/18/2019 left HLL     Today:  Patient is here today for follow-up for kidney stones.  Patient denies any spontaneous stone passage over the last year.  Patient states that he has intermittent lower back pain.  He states that 1 day its on the left the other day is on the right.  He only has a bowel movement twice a week.  He is not taking any medication for this.  He states that sometimes his back pain does affect his right lower extremity.  The way he describes this is consistent with sciatica.    Past Medical History:   Diagnosis Date    Adult behavior problem     Stone, kidney     Wears glasses      Past Surgical History:   Procedure Laterality Date    APPENDECTOMY      CYSTOSCOPY      CYSTOSCOPY Left 04/18/2019    per Dr. Almanza    CYSTOSCOPY Left 4/18/2019    CYSTOSCOPY URETEROSCOPY LASER- LEFT HLL, WITH LEFT STENT PLACEMENT performed by Stew Almanza MD at [facility] OR    CYSTOSCOPY INSERTION / REMOVAL STENT / STONE Left 4/25/2019    CYSTOSCOPY STENT REMOVAL performed by Stew Almanza MD at [facility] OR    CYSTOURETHROSCOPY/STENT REMOVAL      HERNIA REPAIR      KIDNEY STONE SURGERY      MOUTH SURGERY      URETER STENT PLACEMENT       Outpatient Encounter Medications as of 11/14/2024   Medication Sig Dispense Refill    polyethylene glycol (GLYCOLAX) 17 GM/SCOOP powder Take 17 g by mouth daily 1530 g 5     No facility-administered encounter medications on file as of 11/14/2024.      No current outpatient medications on file prior to visit.     No current facility-administered medications on file prior to visit.     Amoxicillin and Nsaids  Family

## (undated) DEVICE — SYRINGE MED 20ML STD CLR PLAS LUERLOCK TIP N CTRL DISP

## (undated) DEVICE — Z INACTIVE USE 2660664 SOLUTION IRRIG 3000ML 0.9% SOD CHL USP UROMATIC PLAS CONT

## (undated) DEVICE — CYSTOSCOPY PACK: Brand: CONVERTORS

## (undated) DEVICE — SOLUTION IV IRRIG WATER 1000ML POUR BRL 2F7114

## (undated) DEVICE — SPONGE GZ W4XL4IN CELOS POSTOP AVANT

## (undated) DEVICE — Z DISCONTINUED USE 2624853 GLOVE SURG SZ 75 L12IN THK91MIL BRN LTX FREE

## (undated) DEVICE — 4-PORT MANIFOLD: Brand: NEPTUNE 2

## (undated) DEVICE — SOLUTION SURG PREP CHLORHEXIDINE GLUC 4% 8 OZ EXIDINE

## (undated) DEVICE — Z DISCONTINUED BY MEDLINE USE 2711682 TRAY SKIN PREP DRY W/ PREM GLV

## (undated) DEVICE — ADAPTER URO SCP UROLOK LL

## (undated) DEVICE — GOWN,AURORA,NON-REINFORCED,2XL: Brand: MEDLINE

## (undated) DEVICE — DUAL LUMEN URETERAL CATHETER

## (undated) DEVICE — Z DUP USE 2139333 GUIDEWIRE UROLOGICAL STR STD 0.035 IN X150 CM REG ZIPWIRE LF

## (undated) DEVICE — FIBER LASER HOLM DISP 10611] FORTEC MEDICAL INC]

## (undated) DEVICE — Device

## (undated) DEVICE — NITINOL STONE RETRIEVAL BASKET: Brand: ZERO TIP

## (undated) DEVICE — SINGLE ACTION PUMPING SYSTEM

## (undated) DEVICE — SOLUTION SURG PREP ANTIMICROBIAL 4 OZ SKIN WND EXIDINE

## (undated) DEVICE — GLOVE ORANGE PI 7 1/2   MSG9075